# Patient Record
Sex: MALE | Race: OTHER | Employment: FULL TIME | URBAN - METROPOLITAN AREA
[De-identification: names, ages, dates, MRNs, and addresses within clinical notes are randomized per-mention and may not be internally consistent; named-entity substitution may affect disease eponyms.]

---

## 2017-11-10 ENCOUNTER — HOSPITAL ENCOUNTER (OUTPATIENT)
Age: 32
Discharge: HOME | End: 2017-11-10
Payer: COMMERCIAL

## 2017-11-10 VITALS
DIASTOLIC BLOOD PRESSURE: 78 MMHG | OXYGEN SATURATION: 98 % | WEIGHT: 180 LBS | HEIGHT: 70 IN | HEART RATE: 100 BPM | SYSTOLIC BLOOD PRESSURE: 137 MMHG | TEMPERATURE: 98 F | BODY MASS INDEX: 25.77 KG/M2 | RESPIRATION RATE: 18 BRPM

## 2017-11-10 DIAGNOSIS — J40 BRONCHITIS: Primary | ICD-10-CM

## 2017-11-10 PROCEDURE — 99204 OFFICE O/P NEW MOD 45 MIN: CPT

## 2017-11-10 PROCEDURE — 87430 STREP A AG IA: CPT

## 2017-11-10 PROCEDURE — 99203 OFFICE O/P NEW LOW 30 MIN: CPT

## 2017-11-10 RX ORDER — AZITHROMYCIN 250 MG/1
TABLET, FILM COATED ORAL
Qty: 1 PACKAGE | Refills: 0 | Status: SHIPPED | OUTPATIENT
Start: 2017-11-10 | End: 2017-11-15

## 2017-11-10 NOTE — ED INITIAL ASSESSMENT (HPI)
PATIENT ARRIVED AMBULATORY TO ROOM C/O \"A COLD\" X2 WEEKS. PATIENT STATES 'I'M NOT COUGHING ANYMORE\" +NASAL CONGESTION AND SINUS PRESSURE. NO FEVERS. NO N/V/D. EASY NON LABORED RESPIRATIONS.

## 2017-11-10 NOTE — ED PROVIDER NOTES
Patient presents with:  Cough/URI      HPI:     Kaycee Trimble is a 28year old male who presents for evaluation and management of a chief complaint of active cough with green and yellow sputum for the past 2 weeks.   He denies any chest pain or shortn icteric bilateral, conjunctiva clear  EARS: TM  bilateral: normal  NOSE: nasal turbinates: clear drainage  THROAT: redness noted, uvula midline and airway patent  LUNGS: clear to auscultation bilaterally; no rales, rhonchi, or wheezes    MDM/Assessment/[place]

## 2018-01-22 ENCOUNTER — HOSPITAL ENCOUNTER (OUTPATIENT)
Age: 33
Discharge: HOME | End: 2018-01-22
Payer: COMMERCIAL

## 2018-01-22 VITALS
TEMPERATURE: 99 F | HEIGHT: 70 IN | BODY MASS INDEX: 25.77 KG/M2 | OXYGEN SATURATION: 98 % | RESPIRATION RATE: 18 BRPM | DIASTOLIC BLOOD PRESSURE: 81 MMHG | WEIGHT: 180 LBS | SYSTOLIC BLOOD PRESSURE: 135 MMHG | HEART RATE: 77 BPM

## 2018-01-22 DIAGNOSIS — J06.9 VIRAL UPPER RESPIRATORY TRACT INFECTION: Primary | ICD-10-CM

## 2018-01-22 LAB — S PYO AG THROAT QL: NEGATIVE

## 2018-01-22 PROCEDURE — 99212 OFFICE O/P EST SF 10 MIN: CPT

## 2018-01-22 PROCEDURE — 87430 STREP A AG IA: CPT

## 2018-01-22 NOTE — ED PROVIDER NOTES
Patient Seen in: [facility]    History   Patient presents with:  Cough/URI    Stated Complaint: cough    HPI    Patient is a 22-year-old otherwise healthy male who presents for evaluation of sore throat, cough and congest reactive to light. Neck: Normal range of motion. Neck supple. Cardiovascular: Normal rate, regular rhythm, normal heart sounds and intact distal pulses. Pulmonary/Chest: Effort normal and breath sounds normal.   Nursing note and vitals reviewed.

## 2018-05-08 ENCOUNTER — APPOINTMENT (OUTPATIENT)
Dept: OCCUPATIONAL MEDICINE | Age: 33
End: 2018-05-08
Attending: EMERGENCY MEDICINE

## 2019-06-07 ENCOUNTER — HOSPITAL ENCOUNTER (OUTPATIENT)
Age: 34
Discharge: HOME | End: 2019-06-07
Attending: EMERGENCY MEDICINE
Payer: COMMERCIAL

## 2019-06-07 VITALS
BODY MASS INDEX: 27.2 KG/M2 | WEIGHT: 190 LBS | HEART RATE: 72 BPM | TEMPERATURE: 98 F | OXYGEN SATURATION: 98 % | RESPIRATION RATE: 20 BRPM | SYSTOLIC BLOOD PRESSURE: 134 MMHG | HEIGHT: 70 IN | DIASTOLIC BLOOD PRESSURE: 76 MMHG

## 2019-06-07 DIAGNOSIS — B34.9 VIRAL SYNDROME: Primary | ICD-10-CM

## 2019-06-07 DIAGNOSIS — H10.33 ACUTE CONJUNCTIVITIS OF BOTH EYES, UNSPECIFIED ACUTE CONJUNCTIVITIS TYPE: ICD-10-CM

## 2019-06-07 DIAGNOSIS — J02.9 ACUTE SORE THROAT: ICD-10-CM

## 2019-06-07 PROCEDURE — 87430 STREP A AG IA: CPT

## 2019-06-07 PROCEDURE — 99214 OFFICE O/P EST MOD 30 MIN: CPT

## 2019-06-07 PROCEDURE — 99213 OFFICE O/P EST LOW 20 MIN: CPT

## 2019-06-07 RX ORDER — TOBRAMYCIN 3 MG/ML
1 SOLUTION/ DROPS OPHTHALMIC EVERY 4 HOURS
Qty: 1 BOTTLE | Refills: 0 | Status: SHIPPED | OUTPATIENT
Start: 2019-06-07

## 2019-06-07 NOTE — ED PROVIDER NOTES
Patient Seen in: [facility]    History   Patient presents with:  Sore Throat    Stated Complaint: sore throat/left eye problem    HPI    Patient is here with complaint of starting Sunday 4 days ago just did not feel well tonsils tolerating secretions neck is supple no stridor  Eye:  No scleral icterus. Eyelids appear normal, no lesions. Cardiovascular:  Normal S1 and S2, no murmur, regular, with good peripheral perfusion.   Respiratory:  Lungs clear to auscultation bilate

## 2024-01-09 ENCOUNTER — OFFICE VISIT (OUTPATIENT)
Dept: FAMILY MEDICINE CLINIC | Facility: CLINIC | Age: 39
End: 2024-01-09
Payer: COMMERCIAL

## 2024-01-09 VITALS
HEART RATE: 81 BPM | WEIGHT: 220 LBS | HEIGHT: 71 IN | BODY MASS INDEX: 30.8 KG/M2 | OXYGEN SATURATION: 97 % | RESPIRATION RATE: 18 BRPM | TEMPERATURE: 98 F | SYSTOLIC BLOOD PRESSURE: 118 MMHG | DIASTOLIC BLOOD PRESSURE: 70 MMHG

## 2024-01-09 DIAGNOSIS — H61.22 IMPACTED CERUMEN OF LEFT EAR: ICD-10-CM

## 2024-01-09 DIAGNOSIS — H66.92 ACUTE OTITIS MEDIA, LEFT: Primary | ICD-10-CM

## 2024-01-09 PROCEDURE — 3078F DIAST BP <80 MM HG: CPT | Performed by: NURSE PRACTITIONER

## 2024-01-09 PROCEDURE — 3074F SYST BP LT 130 MM HG: CPT | Performed by: NURSE PRACTITIONER

## 2024-01-09 PROCEDURE — 99202 OFFICE O/P NEW SF 15 MIN: CPT | Performed by: NURSE PRACTITIONER

## 2024-01-09 PROCEDURE — 3008F BODY MASS INDEX DOCD: CPT | Performed by: NURSE PRACTITIONER

## 2024-01-09 RX ORDER — FLUTICASONE PROPIONATE 50 MCG
2 SPRAY, SUSPENSION (ML) NASAL DAILY
Qty: 1 EACH | Refills: 0 | Status: SHIPPED | OUTPATIENT
Start: 2024-01-09 | End: 2024-01-23

## 2024-01-09 RX ORDER — AMOXICILLIN 875 MG/1
875 TABLET, COATED ORAL 2 TIMES DAILY
Qty: 20 TABLET | Refills: 0 | Status: SHIPPED | OUTPATIENT
Start: 2024-01-09 | End: 2024-01-19

## 2024-01-09 NOTE — PATIENT INSTRUCTIONS
It is very important to complete full course of antibiotic.   Flonase  Acetaminophen or ibuprofen according to package instructions as needed for pain  Call or return if symptoms worsen, do not improve in 3 days, or if fever of 100.4 or greater persists for 72 hours.        Middle Ear Infection (Otitis Media)   What is a middle ear infection?   A middle ear infection is an infection of the air-filled space in the ear behind the eardrum. Anyone can get an ear infection, but ear infections are more common in children less than 8 years old.   How does it occur?   Ear infections usually begin with a viral infection of the nose and throat. For example, a cold might lead to an infection of the ear. Ear infections may also occur when you have allergies. The viral infection or allergic reaction can cause swelling of the tube between your ear and throat (the eustachian tube). The swelling may trap bacteria in your middle ear, resulting in a bacterial infection.   Pressure from the buildup of pus or fluid in the ear sometimes causes the eardrum to tear (rupture). The eardrum is a thin membrane that separates the delicate parts of the middle ear from the air and moisture in the ear canal.   What are the symptoms?   You may have one or more of these symptoms:   earache   hearing loss   feeling of blockage in the ear   fever   dizziness.   How is it diagnosed?   Your healthcare provider will ask about your symptoms and look at your eardrum.   Your healthcare provider may check for fluid in the ear using a light called an otoscope to look into the ear canal. A puff of air is blown into the ear and your provider looks for movement of the eardrum. If there is fluid behind the eardrum, the membrane will not move well.   How is it treated?   Antibiotic medicine is a common treatment for ear infections. However, recent studies have shown that the symptoms of ear infections often go away in a couple of days without antibiotics.  Bacteria can become resistant to antibiotics, and the medicine may cause side effects. For these reasons, your healthcare provider may wait 1 to 3 days to see if the symptoms go away on their own before prescribing an antibiotic.   Your provider may recommend a decongestant (tablets or a nasal spray) to help clear the eustachian tube. This may help relieve pressure in the middle ear. For pain take a nonprescription pain reliever such as acetaminophen (Tylenol) or ibuprofen. Carefully follow the directions for using medicines, even if they are nonprescription.   How long will the effects last?   In most cases you should feel better in 2 to 3 days.   If you are taking an antibiotic and your eardrum has not returned to normal when your provider examines you again, you may need to take a different antibiotic or other medicine. In this case, it may take another 1 to 2 weeks before your ear feels normal again.   What can I do to take care of myself?   Follow your healthcare provider's instructions.   If you are taking an antibiotic, take all of it according to the directions, even when the symptoms have gone away before you have finished it.   To help relieve pain, put a warm moist washcloth or a hot water bottle over the ear.   If you have discharge from your ear, you can wipe it away with a washcloth and loosely plug the ear with cotton to catch further drainage. If you have a lot of fluid and pus draining from your ear, the eardrum has probably ruptured. Ask your healthcare provider how to care for the ear if you have discharge. If the discharge is caused by a ruptured eardrum, then you will need to protect the ear from water. You will need one or more follow-up appointments to check the healing of your eardrum.   If you have a fever:   Rest until your temperature has fallen below 100°F (37.8°C). Then become as active as is comfortable.   Ask your provider if you can take aspirin, acetaminophen, or ibuprofen to control  your fever. Anyone under the age of 21 with a viral illness should not take aspirin because of the increased risk of Reye's syndrome.   Keep a daily record of your temperature.   Call your healthcare provider if you have:   a temperature of 101.5 degrees F (38.6 degrees C) or higher that persists even after you take acetaminophen, aspirin, or ibuprofen   a severe headache or worsening pain around the ear   swelling around the ear   increasing dizziness   worsening of your hearing   weakness of one side of your face.   Keep all your appointments. Your healthcare provider may want you to have one or more follow-up exams until signs of inflammation and infection have disappeared.   How can I prevent an ear infection from occurring?   If you tend to get ear infections often, ask your healthcare provider if you need to be checked for allergies. Getting treatment for allergies may help prevent ear infections.   Ask if using decongestants when you have a cold may help prevent you from getting ear infections.   Developed by [employer]   Published by [employer].   Last modified: 2008-01-15

## 2024-01-09 NOTE — PROGRESS NOTES
CHIEF COMPLAINT:     Chief Complaint   Patient presents with    Ear Problem     X 5 days  Sx: left ear clogged         HPI:   Antonio Holden is a 38 year old male who presents to clinic today with complaints of left ear discomfort for 5 days.   Discomfort is described as a pressure and clogged feeling.  Reports: decreased hearing, no ear drainage, + history of ear infections.    Associated symptoms: tender left neck gland.  Denies fever, chills, nasal congestion, sore throat, cough.   Home treatment includes OTC ear wax drops.  No COVID exposure   Recent scuba diving     No current outpatient medications on file.      History reviewed. No pertinent past medical history.   Social History:  Social History     Socioeconomic History    Marital status: Single   Tobacco Use    Smoking status: Never    Smokeless tobacco: Never        REVIEW OF SYSTEMS:   GENERAL: Feeling well otherwise.    SKIN: no unusual skin lesions or rashes  HEENT: See HPI  LUNGS: No cough, shortness of breath, or wheezing.  CARDIOVASCULAR: No chest pain, palpitations  GI: No N/V/C/D.  NEURO: denies headaches or dizziness    EXAM:   /70   Pulse 81   Temp 97.6 °F (36.4 °C)   Resp 18   Ht 5' 11\" (1.803 m)   Wt 220 lb (99.8 kg)   SpO2 97%   BMI 30.68 kg/m²   GENERAL: well developed, well nourished, in no apparent distress  SKIN: no rashes, no suspicious lesions  HEAD: atraumatic, normocephalic  EYES: conjunctiva clear  EARS: Tragus non tender on palpation bilaterally. External auditory canals healthy.   No swelling ,erythema, or tenderness to bilat mastoid area.    Right TM: clear, no bulging, no retraction, no effusion, bony landmarks visualized.   Left TM: not visualized.  Post irrigation: TM erythematous, + bulging, no retraction, no effusion, bony landmarks not visualized.  NOSE: nostrils patent, clear nasal discharge, nasal mucosa pink and noninflamed  THROAT: oral mucosa pink, moist. Posterior pharynx is not erythematous or  injected. No exudates.  NECK: supple, non-tender  LUNGS: clear to auscultation bilaterally. Breathing is non labored.  CARDIO: RRR without murmur  EXTREMITIES: no cyanosis, clubbing or edema  LYMPH: No auricular lymphadenopathy; + left anterior cervical lymphadenopathy.        Cerumen Removal Procedure  Patient gave verbal consent.    Risks and Benefits of removal were discussed with the patient. he agreed to proceed with procedure.    Location:  Left ear   Indication:  TM not visible, local itching, fullness.  Prep:  Hydrogen Peroxide with warm water via ear elephant.  Removal: Otoscope visualization of cerumen and irrigation and curette was used to remove the wax  Patient Status: Tolerated Well; No complications           ASSESSMENT AND PLAN:   Antonio Holden is a 38 year old male who presents with:    ASSESSMENT:  Encounter Diagnoses   Name Primary?    Acute otitis media, left Yes    Impacted cerumen of left ear        PLAN:   Cerumen completely removed from left ear.  Left OM  He may try watchful waiting for 1-2 days, then start amoxicillin.   Meds as listed below.  Tylenol/Motrin prn pain.    Risks, benefits, and side effects of medication explained and discussed.  Comfort measures as described in Patient Instructions.    Discussed S&S that require follow-up.     Requested Prescriptions     Signed Prescriptions Disp Refills    amoxicillin 875 MG Oral Tab 20 tablet 0     Sig: Take 1 tablet (875 mg total) by mouth 2 (two) times daily for 10 days.    fluticasone propionate 50 MCG/ACT Nasal Suspension 1 each 0     Si sprays by Each Nare route daily for 14 days.           Patient Instructions   It is very important to complete full course of antibiotic.   Flonase  Acetaminophen or ibuprofen according to package instructions as needed for pain  Call or return if symptoms worsen, do not improve in 3 days, or if fever of 100.4 or greater persists for 72 hours.        Middle Ear Infection (Otitis Media)   What  is a middle ear infection?   A middle ear infection is an infection of the air-filled space in the ear behind the eardrum. Anyone can get an ear infection, but ear infections are more common in children less than 8 years old.   How does it occur?   Ear infections usually begin with a viral infection of the nose and throat. For example, a cold might lead to an infection of the ear. Ear infections may also occur when you have allergies. The viral infection or allergic reaction can cause swelling of the tube between your ear and throat (the eustachian tube). The swelling may trap bacteria in your middle ear, resulting in a bacterial infection.   Pressure from the buildup of pus or fluid in the ear sometimes causes the eardrum to tear (rupture). The eardrum is a thin membrane that separates the delicate parts of the middle ear from the air and moisture in the ear canal.   What are the symptoms?   You may have one or more of these symptoms:   earache   hearing loss   feeling of blockage in the ear   fever   dizziness.   How is it diagnosed?   Your healthcare provider will ask about your symptoms and look at your eardrum.   Your healthcare provider may check for fluid in the ear using a light called an otoscope to look into the ear canal. A puff of air is blown into the ear and your provider looks for movement of the eardrum. If there is fluid behind the eardrum, the membrane will not move well.   How is it treated?   Antibiotic medicine is a common treatment for ear infections. However, recent studies have shown that the symptoms of ear infections often go away in a couple of days without antibiotics. Bacteria can become resistant to antibiotics, and the medicine may cause side effects. For these reasons, your healthcare provider may wait 1 to 3 days to see if the symptoms go away on their own before prescribing an antibiotic.   Your provider may recommend a decongestant (tablets or a nasal spray) to help clear the  eustachian tube. This may help relieve pressure in the middle ear. For pain take a nonprescription pain reliever such as acetaminophen (Tylenol) or ibuprofen. Carefully follow the directions for using medicines, even if they are nonprescription.   How long will the effects last?   In most cases you should feel better in 2 to 3 days.   If you are taking an antibiotic and your eardrum has not returned to normal when your provider examines you again, you may need to take a different antibiotic or other medicine. In this case, it may take another 1 to 2 weeks before your ear feels normal again.   What can I do to take care of myself?   Follow your healthcare provider's instructions.   If you are taking an antibiotic, take all of it according to the directions, even when the symptoms have gone away before you have finished it.   To help relieve pain, put a warm moist washcloth or a hot water bottle over the ear.   If you have discharge from your ear, you can wipe it away with a washcloth and loosely plug the ear with cotton to catch further drainage. If you have a lot of fluid and pus draining from your ear, the eardrum has probably ruptured. Ask your healthcare provider how to care for the ear if you have discharge. If the discharge is caused by a ruptured eardrum, then you will need to protect the ear from water. You will need one or more follow-up appointments to check the healing of your eardrum.   If you have a fever:   Rest until your temperature has fallen below 100°F (37.8°C). Then become as active as is comfortable.   Ask your provider if you can take aspirin, acetaminophen, or ibuprofen to control your fever. Anyone under the age of 21 with a viral illness should not take aspirin because of the increased risk of Reye's syndrome.   Keep a daily record of your temperature.   Call your healthcare provider if you have:   a temperature of 101.5 degrees F (38.6 degrees C) or higher that persists even after you take  acetaminophen, aspirin, or ibuprofen   a severe headache or worsening pain around the ear   swelling around the ear   increasing dizziness   worsening of your hearing   weakness of one side of your face.   Keep all your appointments. Your healthcare provider may want you to have one or more follow-up exams until signs of inflammation and infection have disappeared.   How can I prevent an ear infection from occurring?   If you tend to get ear infections often, ask your healthcare provider if you need to be checked for allergies. Getting treatment for allergies may help prevent ear infections.   Ask if using decongestants when you have a cold may help prevent you from getting ear infections.   Developed by [employer]   Published by [employer].   Last modified: 2008-01-15        Patient voiced understanding and is in agreement with treatment plan